# Patient Record
Sex: FEMALE | Race: WHITE | NOT HISPANIC OR LATINO | Employment: FULL TIME | URBAN - METROPOLITAN AREA
[De-identification: names, ages, dates, MRNs, and addresses within clinical notes are randomized per-mention and may not be internally consistent; named-entity substitution may affect disease eponyms.]

---

## 2017-01-13 ENCOUNTER — GENERIC CONVERSION - ENCOUNTER (OUTPATIENT)
Dept: OTHER | Facility: OTHER | Age: 37
End: 2017-01-13

## 2017-01-13 ENCOUNTER — LAB CONVERSION - ENCOUNTER (OUTPATIENT)
Dept: OTHER | Facility: OTHER | Age: 37
End: 2017-01-13

## 2017-01-13 LAB — TSH SERPL DL<=0.05 MIU/L-ACNC: 1.34 MIU/L

## 2017-02-21 ENCOUNTER — GENERIC CONVERSION - ENCOUNTER (OUTPATIENT)
Dept: OTHER | Facility: OTHER | Age: 37
End: 2017-02-21

## 2018-01-10 NOTE — MISCELLANEOUS
Message  Return to work or school:   Peace Rose is under my professional care   She was seen in my office on 1/28/16   She is able to return to work on  1/30/16            Future Appointments    Signatures   Electronically signed by : Gerald Olguin DO; Jan 28 2016  5:29PM EST                       (Author)

## 2018-01-12 NOTE — RESULT NOTES
Verified Results  (Q) TSH, 3RD GENERATION W/REFLEX TO FT4 [id number] 01:10PM Timothy Estrada   REPORT COMMENT:  FASTING:NO     Test Name Result Flag Reference   TSH W/REFLEX TO FT4 1 34 mIU/L     Reference Range                         > or = 20 Years  0 40-4 50                              Pregnancy Ranges            First trimester    0 26-2 66            Second trimester   0 55-2 73            Third trimester    0 43-2 91

## 2018-01-16 NOTE — RESULT NOTES
Verified Results  (1) COMPREHENSIVE METABOLIC PANEL [id number] 73:05OV [facility]     Test Name Result Flag Reference   GLUCOSE 90 mg/dL  65-99   Fasting reference interval   UREA NITROGEN (BUN) 18 mg/dL  7-25   CREATININE 0 88 mg/dL  0 50-1 10   eGFR NON-AFR   AMERICAN 85 mL/min/1 73m2  > OR = 60   eGFR AFRICAN AMERICAN 99 mL/min/1 73m2  > OR = 60   BUN/CREATININE RATIO   9-85   NOT APPLICABLE (calc)   SODIUM 138 mmol/L  135-146   POTASSIUM 4 3 mmol/L  3 5-5 3   CHLORIDE 103 mmol/L     CARBON DIOXIDE 27 mmol/L  20-31   CALCIUM 9 6 mg/dL  8 6-10 2   PROTEIN, TOTAL 7 0 g/dL  6 1-8 1   ALBUMIN 4 3 g/dL  3 6-5 1   GLOBULIN 2 7 g/dL (calc)  1 9-3 7   ALBUMIN/GLOBULIN RATIO 1 6 (calc)  1 0-2 5   BILIRUBIN, TOTAL 1 2 mg/dL  0 2-1 2   ALKALINE PHOSPHATASE 29 U/L L    AST 13 U/L  10-30   ALT 12 U/L  6-29     (1) CBC/PLT/DIFF [id number] 11:03AM [facility]     Test Name Result Flag Reference   WHITE BLOOD CELL COUNT 5 5 Thousand/uL  3 8-10 8   RED BLOOD CELL COUNT 4 35 Million/uL  3 80-5 10   HEMOGLOBIN 13 3 g/dL  11 7-15 5   HEMATOCRIT 41 5 %  35 0-45 0   MCV 95 3 fL  80 0-100 0   MCH 30 5 pg  27 0-33 0   MCHC 32 0 g/dL  32 0-36 0   RDW 14 7 %  11 0-15 0   PLATELET COUNT 008 Thousand/uL  140-400   MPV 10 3 fL  7 5-11 5   ABSOLUTE NEUTROPHILS 3559 cells/uL  7169-3150   ABSOLUTE LYMPHOCYTES 1639 cells/uL  850-3900   ABSOLUTE MONOCYTES 176 cells/uL L 200-950   ABSOLUTE EOSINOPHILS 116 cells/uL     ABSOLUTE BASOPHILS 11 cells/uL  0-200   NEUTROPHILS 64 7 %     LYMPHOCYTES 29 8 %     MONOCYTES 3 2 %     EOSINOPHILS 2 1 %     BASOPHILS 0 2 %       (1) FOLATE 18Oct2016 11:03AM [facility]     Test Name Result Flag Reference   FOLATE, SERUM 11 5 ng/mL     Reference Range                             Low:           <3 4                             Borderline:    3 4-5 4                             Normal:        >5 4     (1) T4, FREE [id number] 11:03AM Mary Grace Kee     Test Name Result Flag Reference   T4, FREE 1 0 ng/dL  0 8-1 8     (Q) THYROID PEROXIDASE AND THYROGLOBULIN ANTIBODIES 18Oct2016 11:03AM [facility]     Test Name Result Flag Reference   THYROGLOBULIN ANTIBODIES <1 IU/mL  < or = 1   THYROID PEROXIDASE$ANTIBODIES 5 IU/mL  <9     (Q) SJOGRENS ANTIBODIES (SS-A,SS-B) [id number] 11:03AM Justice Brian     Test Name Result Flag Reference   SJOGREN'S ANTIBODY (SS-A) <1 0 NEG AI  <1 0 NEG   SJOGREN'S ANTIBODY (SS-B) <1 0 NEG AI  <1 0 NEG     (Q) GERMÁN BARR VIRUS ANTIBODY PANEL 18Oct2016 11:03AM [facility]     Test Name Result Flag Reference   GERMÁN RG VIRUS VCA$ANTIBODY (IGM) < OR = 0 90     Value         Interpretation                             -----         --------------                             < or = 0 90   Negative                             0  91-1 09     Equivocal                             > or = 1 10   Positive   GERMÁN RG VIRUS VCA$ANTIBODY (IGG) 3 60 H    Value         Interpretation                             -----         --------------                             < or = 0 90   Negative                             0  91-1 09     Equivocal                             > or = 1 10   Positive   GERMÁN BARR VIRUS (EBNA)$ANTIBODY (IGG) 4 15 H    Value         Interpretation                             -----         --------------                             < or = 0 90   Negative                             0  91-1 09     Equivocal                             > or = 1 10   Positive   INTERPRETATION:      Suggestive of a past Germán-Barr virus infection  In infants, a similar pattern may occur as a result  of passive maternal transfer of antibody       (1) OP AVIVA FERRITIN [id number] 11:03AM [facility]     Test Name Result Flag Reference   FERRITIN 14 ng/mL       (Q) TSH, 3RD GENERATION 18Oct2016 11:03AM [facility]     Test Name Result Flag Reference   TSH 3 44 mIU/L     Reference Range                         > or = 20 Years  0 40-4 50 Pregnancy Ranges            First trimester    0 26-2 66            Second trimester   0 55-2 73            Third trimester    0 43-2 91     *(Q) VITAMIN D, 25-HYDROXY, LC/MS/MS 18Oct2016 11:03AM Latisha Bradford     Test Name Result Flag Reference   VITAMIN D, 25-OH, TOTAL 27 ng/mL L    Vitamin D Status         25-OH Vitamin D:     Deficiency:                    <20 ng/mL  Insufficiency:             20 - 29 ng/mL  Optimal:                 > or = 30 ng/mL     For 25-OH Vitamin D testing on patients on   D2-supplementation and patients for whom quantitation   of D2 and D3 fractions is required, the QuestAssureD(TM)  25-OH VIT D, (D2,D3), LC/MS/MS is recommended: order   code 65746 (patients >2yrs)  For more information on this test, go to:  http://[employer]/faq/OZL421  (This link is being provided for   informational/educational purposes only )     (Q) HEMOGLOBIN A1c 18Oct2016 11:03AM Latisha Felder   REPORT COMMENT:  FASTING:NO     Test Name Result Flag Reference   HEMOGLOBIN A1c 5 3 % of total Hgb  <5 7   According to ADA guidelines, hemoglobin A1c <7 0%  represents optimal control in non-pregnant diabetic  patients  Different metrics may apply to specific  patient populations  Standards of Medical Care in  [phone]  Diabetes Care  2013;36:s11-s66     For the purpose of screening for the presence of  diabetes  <5 7%       Consistent with the absence of diabetes  5 7-6 4%    Consistent with increased risk for diabetes              (prediabetes)  >or=6 5%    Consistent with diabetes     This assay result is consistent with a decreased risk  of diabetes  Currently, no consensus exists for use of hemoglobin  A1c for diagnosis of diabetes for children

## 2018-01-17 NOTE — RESULT NOTES
Verified Results  (1) COMPREHENSIVE METABOLIC PANEL [id number] 43:83OU [facility]     Test Name Result Flag Reference   GLUCOSE 90 mg/dL  65-99   Fasting reference interval   UREA NITROGEN (BUN) 12 mg/dL  7-25   CREATININE 0 69 mg/dL  0 50-1 10   eGFR NON-AFR   AMERICAN 113 mL/min/1 73m2  > OR = 60   eGFR AFRICAN AMERICAN 131 mL/min/1 73m2  > OR = 60   BUN/CREATININE RATIO   1-43   NOT APPLICABLE (calc)   SODIUM 140 mmol/L  135-146   POTASSIUM 4 0 mmol/L  3 5-5 3   CHLORIDE 108 mmol/L     CARBON DIOXIDE 23 mmol/L  19-30   CALCIUM 8 6 mg/dL  8 6-10 2   PROTEIN, TOTAL 6 0 g/dL L 6 1-8 1   ALBUMIN 3 7 g/dL  3 6-5 1   GLOBULIN 2 3 g/dL (calc)  1 9-3 7   ALBUMIN/GLOBULIN RATIO 1 6 (calc)  1 0-2 5   BILIRUBIN, TOTAL 1 4 mg/dL H 0 2-1 2   ALKALINE PHOSPHATASE 22 U/L L    AST 17 U/L  10-30   ALT 15 U/L  6-29     (1) CBC/PLT/DIFF [id number] 10:20AM [facility]     Test Name Result Flag Reference   WHITE BLOOD CELL COUNT 4 4 Thousand/uL  3 8-10 8   RED BLOOD CELL COUNT 4 01 Million/uL  3 80-5 10   HEMOGLOBIN 11 3 g/dL L 11 7-15 5   HEMATOCRIT 35 0 %  35 0-45 0   MCV 87 3 fL  80 0-100 0   MCH 28 1 pg  27 0-33 0   MCHC 32 2 g/dL  32 0-36 0   RDW 16 2 % H 11 0-15 0   PLATELET COUNT 893 Thousand/uL  140-400   MPV 10 2 fL  7 5-11 5   ABSOLUTE NEUTROPHILS 2561 cells/uL  9600-2029   ABSOLUTE LYMPHOCYTES 1514 cells/uL  850-3900   ABSOLUTE MONOCYTES 189 cells/uL L 200-950   ABSOLUTE EOSINOPHILS 123 cells/uL     ABSOLUTE BASOPHILS 13 cells/uL  0-200   NEUTROPHILS 58 2 %     LYMPHOCYTES 34 4 %     MONOCYTES 4 3 %     EOSINOPHILS 2 8 %     BASOPHILS 0 3 %       (1) FOLATE [id number] 10:20AM [facility]     Test Name Result Flag Reference   FOLATE, SERUM 8 3 ng/mL     Reference Range                             Low:           <3 4                             Borderline:    3 4-5 4                             Normal:        >5 4     (1) VITAMIN B12 [id number] 10:20AM Alexandra Tran     Test Name Result Flag Reference   VITAMIN B12 285 pg/mL  200-1100   Please Note: Although the reference range for vitamin  B12 is 200-1100 pg/mL, it has been reported that between  5 and 10% of patients with values between 200 and 400  pg/mL may experience neuropsychiatric and hematologic  abnormalities due to occult B12 deficiency; less than 1%  of patients with values above 400 pg/mL will have symptoms  (Q) LIPID PANEL WITH REFLEX TO DIRECT LDL [id number] 10:20AM [facility]     Test Name Result Flag Reference   CHOLESTEROL, TOTAL 160 mg/dL  125-200   HDL CHOLESTEROL 47 mg/dL  > OR = 46   TRIGLICERIDES 521 mg/dL H <150   LDL-CHOLESTEROL 81 mg/dL (calc)  <130   Desirable range <100 mg/dL for patients with CHD or  diabetes and <70 mg/dL for diabetic patients with  known heart disease  CHOL/HDLC RATIO 3 4 (calc)  < OR = 5 0   NON HDL CHOLESTEROL 113 mg/dL (calc)     Target for non-HDL cholesterol is 30 mg/dL higher than   LDL cholesterol target  (Q) SED RATE BY MODIFIED Rodolfo Matthews 10:20AM [facility]     Test Name Result Flag Reference   SED RATE BY MODIFIEDHOME 2 mm/h  < OR = 20     (Q) LYME DISEASE AB, TOTAL W/REFL WB (IGG, IGM) [id number] 10:20AM [facility]     Test Name Result Flag Reference   LYME AB SCREEN < OR = 0 90 index     Index                 Interpretation                     -----                 --------------                     < or = 0 90           Negative                     0  91-1 09             Equivocal                     > or = 1 10           Positive     The use of purified VlsE-1 and PepC10 antigens in this  assay provides improved specificity compared to assays  that utilize whole cell lysates of B  burgdorferi, the  causative agent of Lyme disease, and slightly better  sensitivity compared to the C6 antibody assay       As recommended by the Food and Drug   Administration (FDA), all samples with positive or   equivocal results in a Borrelia burgdorferi antibody    EIA (screening) will be tested using a blot method  Positive or equivocal screening test results should not   be interpreted as truly positive until verified as such   using a supplemental assay (e g , B  burgdorferi blot)  The screening test and/or blot for B  burgdorferi   antibodies may be falsely negative in early stages of   Lyme disease, including the period when erythema   migrans is apparent  (1) OP AVIVA FERRITIN [id number] 10:20AM [facility]     Test Name Result Flag Reference   FERRITIN 7 ng/mL L      (Q) TSH, 3RD GENERATION [id number] 10:20AM [facility]     Test Name Result Flag Reference   TSH 3 62 mIU/L     Reference Range                         > or = 20 Years  0 40-4 50                              Pregnancy Ranges            First trimester    0 26-2 66            Second trimester   0 55-2 73            Third trimester    0 43-2 91     *(Q) VITAMIN D, 25-HYDROXY, LC/MS/MS [id number] 10:20AM [facility]     Test Name Result Flag Reference   VITAMIN D, 25-OH, TOTAL 33 ng/mL     Vitamin D Status         25-OH Vitamin D:     Deficiency:                    <20 ng/mL  Insufficiency:             20 - 29 ng/mL  Optimal:                 > or = 30 ng/mL     For 25-OH Vitamin D testing on patients on   D2-supplementation and patients for whom quantitation   of D2 and D3 fractions is required, the QuestAssureD(TM)  25-OH VIT D, (D2,D3), LC/MS/MS is recommended: order   code 45144 (patients >2yrs)  For more information on this test, go to:  [email or web address]/ONY895  (This link is being provided for   informational/educational purposes only )     (Q) HEMOGLOBIN A1c [id number] 10:20AM [facility]   REPORT COMMENT:  FASTING:YES     Test Name Result Flag Reference   HEMOGLOBIN A1c 5 4 % of total Hgb  <5 7   According to ADA guidelines, hemoglobin A1c <7 0%  represents optimal control in non-pregnant diabetic  patients   Different metrics may apply to specific  patient populations  Standards of Medical Care in    Diabetes Care  2013;36:s11-s66     For the purpose of screening for the presence of  diabetes  <5 7%       Consistent with the absence of diabetes  5 7-6 4%    Consistent with increased risk for diabetes              (prediabetes)  >or=6 5%    Consistent with diabetes     This assay result is consistent with a decreased risk  of diabetes  Currently, no consensus exists for use of hemoglobin  A1c for diagnosis of diabetes for children
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
